# Patient Record
Sex: FEMALE | Employment: OTHER | URBAN - METROPOLITAN AREA
[De-identification: names, ages, dates, MRNs, and addresses within clinical notes are randomized per-mention and may not be internally consistent; named-entity substitution may affect disease eponyms.]

---

## 2018-02-27 ENCOUNTER — OFFICE VISIT (OUTPATIENT)
Dept: OPTOMETRY | Facility: CLINIC | Age: 83
End: 2018-02-27
Payer: COMMERCIAL

## 2018-02-27 DIAGNOSIS — H52.223 REGULAR ASTIGMATISM OF BOTH EYES: ICD-10-CM

## 2018-02-27 DIAGNOSIS — Z96.1 PSEUDOPHAKIA OF BOTH EYES: ICD-10-CM

## 2018-02-27 DIAGNOSIS — H52.4 PRESBYOPIA: Primary | ICD-10-CM

## 2018-02-27 DIAGNOSIS — H04.123 INSUFFICIENCY OF TEAR FILM OF BOTH EYES: ICD-10-CM

## 2018-02-27 PROCEDURE — 92015 DETERMINE REFRACTIVE STATE: CPT | Performed by: OPTOMETRIST

## 2018-02-27 PROCEDURE — 92014 COMPRE OPH EXAM EST PT 1/>: CPT | Performed by: OPTOMETRIST

## 2018-02-27 ASSESSMENT — VISUAL ACUITY
OS_CC: 20/30-2
OD_CC: 20/40
OS_SC: 20/50
OD_SC+: -2
CORRECTION_TYPE: GLASSES
OD_SC: 20/50
METHOD: SNELLEN - LINEAR
OS_CC: 20/40
OD_CC: 20/80-1

## 2018-02-27 ASSESSMENT — REFRACTION_MANIFEST
OS_ADD: +2.50
OD_SPHERE: -1.00
OS_AXIS: 005
OD_CYLINDER: +1.00
OS_SPHERE: -0.75
OD_AXIS: 008
OD_ADD: +2.50
OS_CYLINDER: +1.50

## 2018-02-27 ASSESSMENT — CONF VISUAL FIELD
OD_NORMAL: 1
OS_NORMAL: 1

## 2018-02-27 ASSESSMENT — TONOMETRY
OD_IOP_MMHG: 18
IOP_METHOD: TONOPEN
OS_IOP_MMHG: 15

## 2018-02-27 ASSESSMENT — SLIT LAMP EXAM - LIDS
COMMENTS: MEIBOMIAN GLAND DYSFUNCTION
COMMENTS: MEIBOMIAN GLAND DYSFUNCTION

## 2018-02-27 ASSESSMENT — EXTERNAL EXAM - RIGHT EYE: OD_EXAM: ROSACEA

## 2018-02-27 ASSESSMENT — REFRACTION_WEARINGRX
OS_AXIS: 013
OD_AXIS: 008
OD_ADD: +2.75
SPECS_TYPE: BIFOCAL
OD_CYLINDER: +0.75
OS_SPHERE: -0.50
OS_CYLINDER: +1.00
OD_SPHERE: -0.50
OS_ADD: +2.75

## 2018-02-27 ASSESSMENT — EXTERNAL EXAM - LEFT EYE: OS_EXAM: ROSACEA

## 2018-02-27 ASSESSMENT — CUP TO DISC RATIO
OD_RATIO: 0.4
OS_RATIO: 0.4

## 2018-02-27 NOTE — PATIENT INSTRUCTIONS
Eyeglass prescription given.    Systane Balance- 1 drop both eyes 4 x day.    Return in 1 year for a complete eye exam or sooner if needed.    Sarabjit Acevedo, TEMITOPE    The affects of the dilating drops last for 4- 6 hours.  You will be more sensitive to light and vision will be blurry up close.  Mydriatic sunglasses were given if needed.      Optometry Providers       Clinic Locations                                 Telephone Number   Dr. Elana Ross [facility] and [place]   [place] [phone]     [place] [facility] Hours:                [place] [facility] Hours:       [place] [facility] Hours:   [address]   [address]     [address] [facility] [place] [address]   [place] [address]    [place] [address]  Phone: [phone]                    Phone: [phone]     Phone: [phone]                      Monday 8:00-7:00                          Monday 8:00-7:00                          Monday 8:00-7:00              Tuesday 8:00-6:00                          Tuesday 8:00-7:00                          Tuesday 8:00-7:00              Wednesday 8:00-6:00                  Wednesday 8:00-7:00                   Wednesday 8:00-7:00      Thursday 8:00-6:00                        Thursday 8:00-7:00                         Thursday 8:00-7:00            Friday 8:00-5:00                              Friday 8:00-5:00                              Friday 8:00-5:00    [facility] Hours:   [address] [place] [address] [place] [address]  [phone]    Monday 8:00-7:00  Tuesday 8:00-7:00  Wednesday 8:00-7:00  Thursday 8:00-7:00  Friday 8:00-5:00  Please log on to [email or web address] to order your contact lenses.  The link is found on the [facility] Care [facility] Vision Services page.  As always, Thank you for trusting us with your health care needs!

## 2018-02-27 NOTE — LETTER
2/27/2018         RE: Jim Webster  [address]  [facility] [address]        Dear Colleague,    Thank you for referring your patient, Jim Webster, to the [facility]. Please see a copy of my visit note below.    Chief Complaint   Patient presents with     COMPREHENSIVE EYE EXAM         Last Eye Exam: 5-  Dilated Previously: Yes    What are you currently using to see?  glasses       Distance Vision Acuity: Satisfied with vision    Near Vision Acuity: Satisfied with vision while reading  with glasses    Eye Comfort: dry  Do you use eye drops? : Yes: systane and alway  Occupation or Hobbies: retired    Rula Cuellar Optometric Assistant, A.B.O.C.          Medical, surgical and family histories reviewed and updated 2/27/2018.       OBJECTIVE: See Ophthalmology exam    ASSESSMENT:    ICD-10-CM    1. Presbyopia H52.4 REFRACTION   2. Regular astigmatism of both eyes H52.223 REFRACTION   3. Pseudophakia of both eyes Z96.1 EYE EXAM (SIMPLE-NONBILLABLE)   4. 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    Systane Balance- 1 drop both eyes 4 x day.    Return in 1 year for a complete eye exam or sooner if needed.    Sarabjit Acevedo, TEMITOPE           Again, thank you for allowing me to participate in the care of your patient.        Sincerely,        Sarabjit Acevedo, OD

## 2018-02-27 NOTE — MR AVS SNAPSHOT
After Visit Summary   2/27/2018    Jim Webster    MRN: [id number]           Patient Information     Date Of Birth          1935        Visit Information        Provider Department      2/27/2018 10:00 AM Sarabjit Acevedo OD [facility]        Today's Diagnoses     Presbyopia    -  1    Regular astigmatism of both eyes        Pseudophakia of both eyes        Insufficiency of tear film of both eyes          Care Instructions    Eyeglass prescription given.    Systane Balance- 1 drop both eyes 4 x day.    Return in 1 year for a complete eye exam or sooner if needed.    Sarabjit Acevedo, TEMITOPE    The affects of the dilating drops last for 4- 6 hours.  You will be more sensitive to light and vision will be blurry up close.  Mydriatic sunglasses were given if needed.      Optometry Providers       Clinic Locations                                 Telephone Number   Dr. Elana Ross [facility] [place]   [place] [phone]     [place] [facility] Hours:                [facility] Hours:       [facility] Hours:   [address]   [address] [facility]     [address] [place] [address]   [place] [address]    [place] [address]  Phone: [phone]                    Phone: [phone]     Phone: [phone]                      Monday 8:00-7:00                          Monday 8:00-7:00                          Monday 8:00-7:00              Tuesday 8:00-6:00                          Tuesday 8:00-7:00                          Tuesday 8:00-7:00              Wednesday 8:00-6:00                  Wednesday 8:00-7:00                   Wednesday 8:00-7:00      Thursday 8:00-6:00                        Thursday 8:00-7:00                         Thursday 8:00-7:00            Friday 8:00-5:00                              Friday 8:00-5:00                       "        Friday 8:00-5:00    [facility] Hours:   [address] [place] [address]  [phone]    Monday 8:00-7:00  Tuesday 8:00-7:00  Wednesday 8:00-7:00  Thursday 8:00-7:00  Friday 8:00-5:00  Please log on to [email or web address] to order your contact lenses.  The link is found on the Eye Care and Vision Services page.  As always, Thank you for trusting us with your health care needs!              Follow-ups after your visit        Follow-up notes from your care team     Return in about 1 year (around 2019) for Annual Visit.      Who to contact     If you have questions or need follow up information about today's clinic visit or your schedule please contact [facility] directly at [phone].  Normal or non-critical lab and imaging results will be communicated to you by MyChart, letter or phone within 4 business days after the clinic has received the results. If you do not hear from us within 7 days, please contact the clinic through [employer] or phone. If you have a critical or abnormal lab result, we will notify you by phone as soon as possible.  Submit refill requests through [employer] or call your pharmacy and they will forward the refill request to us. Please allow 3 business days for your refill to be completed.          Additional Information About Your Visit        [employer] Information     [employer] lets you send messages to your doctor, view your test results, renew your prescriptions, schedule appointments and more. To sign up, go to [email or web address] . Click on \"Log in\" on the left side of the screen, which will take you to the Welcome page. Then click on \"Sign up Now\" on the right side of the page.     You will be asked to enter the access code listed below, as well as some personal information. Please follow the directions to create your username and password.     Your access code is: [email or web address]  Expires: 2018 12:50 PM     Your access code will  in 90 " days. If you need help or a new code, please call your [facility] clinic or [phone].        Care EveryWhere ID     This is your Care EveryWhere ID. This could be used by other organizations to access your [facility] medical records  [id number]         Blood Pressure from Last 3 Encounters:   01/08/15 138/52   12/11/14 130/56    Weight from Last 3 Encounters:   No data found for Wt              We Performed the Following     EYE EXAM (SIMPLE-NONBILLABLE)     REFRACTION        Primary Care Provider Fax #    Physician No Ref-Primary [phone]       No address on file        Equal Access to Services     [facility]: Hadii aad ku hadasho Soomaali, waaxda luqadaha, qaybta kaalmada adeterrayadamon, archana urrutia . So [facility] [phone].    ATENCIÓN: Si habla español, tiene a buckner disposición servicios gratuitos de asistencia lingüística. Llame al [phone].    We comply with applicable federal civil rights laws and [place] laws. We do not discriminate on the basis of race, color, national origin, age, disability, sex, sexual orientation, or gender identity.            Thank you!     Thank you for choosing [facility]  for your care. Our goal is always to provide you with excellent care. Hearing back from our patients is one way we can continue to improve our services. Please take a few minutes to complete the written survey that you may receive in the mail after your visit with us. Thank you!             Your Updated Medication List - Protect others around you: Learn how to safely use, store and throw away your medicines at www.disposemymeds.org.          This list is accurate as of 2/27/18 12:50 PM.  Always use your most recent med list.                   Brand Name Dispense Instructions for use Diagnosis    ALLEGRA 180 MG tablet   Generic drug:  fexofenadine      Take 180 mg by mouth daily.        lovastatin 20 MG tablet    MEVACOR     Take 20 mg by mouth At Bedtime.         metoprolol succinate 50 MG 24 hr tablet    TOPROL-XL     Take 50 mg by mouth daily.

## 2018-02-27 NOTE — PROGRESS NOTES
Chief Complaint   Patient presents with     COMPREHENSIVE EYE EXAM         Last Eye Exam: 5-  Dilated Previously: Yes    What are you currently using to see?  glasses       Distance Vision Acuity: Satisfied with vision    Near Vision Acuity: Satisfied with vision while reading  with glasses    Eye Comfort: dry  Do you use eye drops? : Yes: systane and alway  Occupation or Hobbies: retired    Rula Cuellar Optometric Assistant, A.B.O.C.          Medical, surgical and family histories reviewed and updated 2/27/2018.       OBJECTIVE: See Ophthalmology exam    ASSESSMENT:    ICD-10-CM    1. Presbyopia H52.4 REFRACTION   2. Regular astigmatism of both eyes H52.223 REFRACTION   3. Pseudophakia of both eyes Z96.1 EYE EXAM (SIMPLE-NONBILLABLE)   4. 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    Systane Balance- 1 drop both eyes 4 x day.    Return in 1 year for a complete eye exam or sooner if needed.    Sarabjit Acevedo, OD

## 2019-08-27 ENCOUNTER — OFFICE VISIT (OUTPATIENT)
Dept: OPTOMETRY | Facility: CLINIC | Age: 84
End: 2019-08-27
Payer: COMMERCIAL

## 2019-08-27 DIAGNOSIS — H52.223 REGULAR ASTIGMATISM OF BOTH EYES: ICD-10-CM

## 2019-08-27 DIAGNOSIS — H04.123 INSUFFICIENCY OF TEAR FILM OF BOTH EYES: ICD-10-CM

## 2019-08-27 DIAGNOSIS — Z01.00 EXAMINATION OF EYES AND VISION: Primary | ICD-10-CM

## 2019-08-27 DIAGNOSIS — H52.4 PRESBYOPIA: ICD-10-CM

## 2019-08-27 DIAGNOSIS — Z96.1 PSEUDOPHAKIA OF BOTH EYES: ICD-10-CM

## 2019-08-27 DIAGNOSIS — H10.13 ALLERGIC CONJUNCTIVITIS OF BOTH EYES: ICD-10-CM

## 2019-08-27 PROCEDURE — 92015 DETERMINE REFRACTIVE STATE: CPT | Performed by: OPTOMETRIST

## 2019-08-27 PROCEDURE — 92014 COMPRE OPH EXAM EST PT 1/>: CPT | Performed by: OPTOMETRIST

## 2019-08-27 ASSESSMENT — REFRACTION_MANIFEST
OS_SPHERE: -1.00
OD_CYLINDER: +1.00
OD_SPHERE: -1.00
OD_AXIS: 008
OS_ADD: +2.50
OS_AXIS: 005
OD_ADD: +2.50
OS_CYLINDER: +1.50

## 2019-08-27 ASSESSMENT — VISUAL ACUITY
OS_CC: 20/25-3
OS_SC+: -3
OD_CC: 20/30
OD_CC+: -2
OS_SC: 20/25
OD_SC+: -2
METHOD: SNELLEN - LINEAR
CORRECTION_TYPE: GLASSES
OD_CC: 20/25
OS_CC+: -2
OD_SC: 20/40
OS_CC: 20/25

## 2019-08-27 ASSESSMENT — REFRACTION_WEARINGRX
OS_CYLINDER: +1.50
OD_AXIS: 008
OD_ADD: +2.50
OS_ADD: +2.50
OD_SPHERE: -1.00
OS_AXIS: 005
SPECS_TYPE: BIFOCAL
OD_CYLINDER: +1.00
OS_SPHERE: -0.75

## 2019-08-27 ASSESSMENT — CUP TO DISC RATIO
OS_RATIO: 0.4
OD_RATIO: 0.4

## 2019-08-27 ASSESSMENT — CONF VISUAL FIELD
OS_NORMAL: 1
METHOD: COUNTING FINGERS
OD_NORMAL: 1

## 2019-08-27 ASSESSMENT — EXTERNAL EXAM - LEFT EYE: OS_EXAM: ROSACEA

## 2019-08-27 ASSESSMENT — SLIT LAMP EXAM - LIDS
COMMENTS: MEIBOMIAN GLAND DYSFUNCTION
COMMENTS: MEIBOMIAN GLAND DYSFUNCTION

## 2019-08-27 ASSESSMENT — TONOMETRY
OD_IOP_MMHG: 17
OS_IOP_MMHG: 15
IOP_METHOD: TONOPEN

## 2019-08-27 ASSESSMENT — EXTERNAL EXAM - RIGHT EYE: OD_EXAM: ROSACEA

## 2019-08-27 NOTE — PATIENT INSTRUCTIONS
Eyeglass prescription given.  No change in eyeglass prescription.    Systane Balance- 1 drop both eyes 4 x day.    OTC Alaway- 1 drop both eyes 2 x day as needed for itchy, watery eyes.    Return in 1 year for a complete eye exam or sooner if needed.    Sarabjit Acevedo, OD    The affects of the dilating drops last for 4- 6 hours.  You will be more sensitive to light and vision will be blurry up close.  Mydriatic sunglasses were given if needed.      Optometry Providers       Clinic Locations                                 Telephone Number   [facility]   [place] [phone]     [facility] [facility] Hours:                [facility] Hours:       [facility] Hours:   [address]   [address] [facility]     [address] [facility] [facility]  [place] [address]   [place] [address]    [place] [address]  Phone: [phone]                    Phone: [phone]     Phone: [phone]                      Monday 8:00-7:00                          Monday 8:00-7:00                          Monday 8:00-7:00              Tuesday 8:00-6:00                          Tuesday 8:00-7:00                          Tuesday 8:00-7:00              Wednesday 8:00-6:00                  Wednesday 8:00-7:00                   Wednesday 8:00-7:00      Thursday 8:00-6:00                        Thursday 8:00-7:00                         Thursday 8:00-7:00            Friday 8:00-5:00                              Friday 8:00-5:00                              Friday 8:00-5:00    [facility] Hours:   [address] [facility] [place] [place] [id number] [phone]    Monday 8:00-7:00  Tuesday 8:00-7:00  Wednesday 8:00-7:00  Thursday 8:00-7:00  Friday 8:00-5:00  Please log on to [email or web address] to order your contact lenses.  The link is found on the Eye Care and Vision Services page.  As always, Thank you for trusting us with your health care  needs!

## 2019-08-27 NOTE — LETTER
8/27/2019         RE: Jim Webster  [address]  [facility] [address]        Dear Colleague,    Thank you for referring your patient, Jim Webster, to the [facility]. Please see a copy of my visit note below.    Chief Complaint   Patient presents with     Annual Eye Exam      Accompanied by  not in room.  Last Eye Exam: 2/2018  Dilated Previously: Yes    What are you currently using to see?  glasses       Distance Vision Acuity: Satisfied with vision    Near Vision Acuity: Satisfied with vision while reading  with glasses    Eye Comfort: dry, watery and itchy - especially during spring and fall  Do you use eye drops? : Yes: Pt uses Systane or alaway which seems to help comfort issues.  Pt uses as needed  Occupation or Hobbies: retired - Pt loves to read and garden    [employer]            Medical, surgical and family histories reviewed and updated 8/27/2019.       OBJECTIVE: See Ophthalmology exam    ASSESSMENT:    ICD-10-CM    1. Examination of eyes and vision Z01.00 EYE EXAM (SIMPLE-NONBILLABLE)   2. Presbyopia H52.4 REFRACTION   3. Regular astigmatism of both eyes H52.223 REFRACTION   4. Pseudophakia of both eyes Z96.1 EYE EXAM (SIMPLE-NONBILLABLE)   5. Insufficiency of tear film of both eyes H04.123 EYE EXAM (SIMPLE-NONBILLABLE)   6. Allergic conjunctivitis of both eyes H10.13 EYE EXAM (SIMPLE-NONBILLABLE)      PLAN:     Patient Instructions   Eyeglass prescription given.  No change in eyeglass prescription.    Systane Balance- 1 drop both eyes 4 x day.    OTC Alaway- 1 drop both eyes 2 x day as needed for itchy, watery eyes.    Return in 1 year for a complete eye exam or sooner if needed.    Sarabjit Acevedo, OD           Again, thank you for allowing me to participate in the care of your patient.        Sincerely,        Sarabjit Acevedo, OD

## 2019-08-27 NOTE — PROGRESS NOTES
Chief Complaint   Patient presents with     Annual Eye Exam      Accompanied by  not in room.  Last Eye Exam: 2/2018  Dilated Previously: Yes    What are you currently using to see?  glasses       Distance Vision Acuity: Satisfied with vision    Near Vision Acuity: Satisfied with vision while reading  with glasses    Eye Comfort: dry, watery and itchy - especially during spring and fall  Do you use eye drops? : Yes: Pt uses Systane or alaway which seems to help comfort issues.  Pt uses as needed  Occupation or Hobbies: retired - Pt loves to read and garden    [employer]            Medical, surgical and family histories reviewed and updated 8/27/2019.       OBJECTIVE: See Ophthalmology exam    ASSESSMENT:    ICD-10-CM    1. Examination of eyes and vision Z01.00 EYE EXAM (SIMPLE-NONBILLABLE)   2. Presbyopia H52.4 REFRACTION   3. Regular astigmatism of both eyes H52.223 REFRACTION   4. Pseudophakia of both eyes Z96.1 EYE EXAM (SIMPLE-NONBILLABLE)   5. Insufficiency of tear film of both eyes H04.123 EYE EXAM (SIMPLE-NONBILLABLE)   6. Allergic conjunctivitis of both eyes H10.13 EYE EXAM (SIMPLE-NONBILLABLE)      PLAN:     Patient Instructions   Eyeglass prescription given.  No change in eyeglass prescription.    Systane Balance- 1 drop both eyes 4 x day.    OTC Alaway- 1 drop both eyes 2 x day as needed for itchy, watery eyes.    Return in 1 year for a complete eye exam or sooner if needed.    Sarabjit Acevedo, OD

## 2020-06-11 ENCOUNTER — THERAPY VISIT (OUTPATIENT)
Dept: PHYSICAL THERAPY | Facility: CLINIC | Age: 85
End: 2020-06-11
Payer: COMMERCIAL

## 2020-06-11 DIAGNOSIS — M25.561 CHRONIC PAIN OF RIGHT KNEE: ICD-10-CM

## 2020-06-11 DIAGNOSIS — G89.29 CHRONIC PAIN OF RIGHT KNEE: ICD-10-CM

## 2020-06-11 PROCEDURE — 97140 MANUAL THERAPY 1/> REGIONS: CPT | Mod: GP | Performed by: PHYSICAL THERAPIST

## 2020-06-11 PROCEDURE — 97110 THERAPEUTIC EXERCISES: CPT | Mod: GP | Performed by: PHYSICAL THERAPIST

## 2020-06-11 PROCEDURE — 97161 PT EVAL LOW COMPLEX 20 MIN: CPT | Mod: GP | Performed by: PHYSICAL THERAPIST

## 2020-06-11 ASSESSMENT — ACTIVITIES OF DAILY LIVING (ADL)
KNEE_ACTIVITY_OF_DAILY_LIVING_SUM: 37
RAW_SCORE: 37
SIT WITH YOUR KNEE BENT: ACTIVITY IS FAIRLY DIFFICULT
HOW_WOULD_YOU_RATE_THE_CURRENT_FUNCTION_OF_YOUR_KNEE_DURING_YOUR_USUAL_DAILY_ACTIVITIES_ON_A_SCALE_FROM_0_TO_100_WITH_100_BEING_YOUR_LEVEL_OF_KNEE_FUNCTION_PRIOR_TO_YOUR_INJURY_AND_0_BEING_THE_INABILITY_TO_PERFORM_ANY_OF_YOUR_USUAL_DAILY_ACTIVITIES?: 60
STIFFNESS: THE SYMPTOM AFFECTS MY ACTIVITY SLIGHTLY
GIVING WAY, BUCKLING OR SHIFTING OF KNEE: I HAVE THE SYMPTOM BUT IT DOES NOT AFFECT MY ACTIVITY
HOW_WOULD_YOU_RATE_THE_OVERALL_FUNCTION_OF_YOUR_KNEE_DURING_YOUR_USUAL_DAILY_ACTIVITIES?: ABNORMAL
WALK: ACTIVITY IS SOMEWHAT DIFFICULT
LIMPING: THE SYMPTOM AFFECTS MY ACTIVITY MODERATELY
KNEEL ON THE FRONT OF YOUR KNEE: ACTIVITY IS FAIRLY DIFFICULT
KNEE_ACTIVITY_OF_DAILY_LIVING_SCORE: 52.86
AS_A_RESULT_OF_YOUR_KNEE_INJURY,_HOW_WOULD_YOU_RATE_YOUR_CURRENT_LEVEL_OF_DAILY_ACTIVITY?: ABNORMAL
SWELLING: THE SYMPTOM AFFECTS MY ACTIVITY SLIGHTLY
SQUAT: ACTIVITY IS FAIRLY DIFFICULT
RISE FROM A CHAIR: ACTIVITY IS MINIMALLY DIFFICULT
WEAKNESS: THE SYMPTOM AFFECTS MY ACTIVITY MODERATELY
STAND: ACTIVITY IS SOMEWHAT DIFFICULT
GO DOWN STAIRS: ACTIVITY IS SOMEWHAT DIFFICULT
PAIN: THE SYMPTOM AFFECTS MY ACTIVITY SEVERELY
GO UP STAIRS: ACTIVITY IS SOMEWHAT DIFFICULT

## 2020-06-11 NOTE — PROGRESS NOTES
[facility] Initial Evaluation  Subjective:  The history is provided by the patient. No  was used.   Therapist Generated HPI Evaluation  Problem details: I have had knee pain since October 2019, the month and 1/2 the knee pain has increased.  I am not sleeping.  .         Type of problem:  Right knee.    This is a chronic condition.  Condition occurred with:  Insidious onset.    Patient reports pain:  In the joint.    Pain radiates to:  Thigh, knee, lower leg and hip.     Associated with: feeeling of giving out  Symptoms are exacerbated by ascending stairs and descending stairs (heat and cold hurt the knee, tylenol, activity does not hurt but will be painful later )  Relieved by: heat in the hip and the pain decreased, bengay, patches.          Patient Health History  Jim Webster being seen for right knee pain .     Problem began: 5/29/2020 (MD appointment).   Problem occurred: unknown   Pain is reported as 3/10 (at night 8-9/10) on pain scale.  General health as reported by patient is good.  Pertinent medical history includes: cancer, diabetes, high blood pressure and osteoarthritis (breast cancer 2000).     Medical allergies: latex and adhesives. Other medical allergies details: penicilllin.   Surgeries include:  Cancer surgery. Other surgery history details: breast, hysterectomy.    Current medications:  High blood pressure medication and sleep medication. Other medications details: statin.          Other job/home tasks details: walking, house work, garden, reading.                                  Objective:  Standing Alignment:    Cervical/Thoracic:  Forward head and convex thoracic scoliosis R (fair sitting posture)  Shoulder/UE:  Rounded shoulders and elevated scapula R    Pelvic:  ASIS high R    Knee deviations alignment: both knees flexed right greater than left.      Gait:    Gait Type:  Antalgic     Deviations:  Hip:  Decr dynamic control R and Trendelenberg  RGeneral Deviations:  Stance time decr, stride length decr and asa decr    Flexibility/Screens:   Positive screens:  Hip and Knee    Lower Extremity:      Decreased right lower extremity flexibility:  Hip IR's; Hip ER's; Adductors; Hip Flexors; IT Band; Quadriceps and Hamstrings               Lumbar/SI Evaluation  ROM:    AROM Lumbar:   Flexion:        Toes no change  Ext:                    Moderate movement no change   Side Bend:        Left:     Right:   Rotation:           Left:     Right:   Side Glide:        Left:     Right:                                                               Hip Evaluation  Hip PROM:    Flexion: Left: WFL   Right: 95  Extension: Left:  Right: tightness  Abduction: Left: 15-20 degrees    Right: 10-15 with pain    Internal Rotation: Left: 10    Right: 0 and tightness  External Rotation: Left: 45    Right: 30 with pain in the knee              Hip Strength:    Flexion:   Left: 5/5   Pain:  Right: 5/5   Pain:                        Adduction:  Right: 2+/5   Pain:  Internal Rotation:  Left: 5/5    Pain:Right: 4+/5   ++  Pain:  External Rotation:  Left: 5/5   Pain:  Right: 4+/5   Pain:            Hip Special Testing:       Right hip positive for the following special tests:  Fadir/Labrum and Yfn (tightness)Right hip negative for the following special tests:  SLR (tightness)    Hip Palpation:      Right hip tenderness present at:  Greater Trachanter; Gluteus Medius and Bursa  Right hip tenderness not present at:  Ischial Tuberosity  Functional Testing:            Proprioception:    Stork balance test:   Left:    2  Right:  1  % of Uninvolved:          Knee Evaluation:  ROM:    AROM      Extension:  Left: 14    Right:  11  Flexion: Left: 118    Right: 122  PROM    Hyperextension: Left: 0    Right:  0  Extension: Left: 0    Right:  10  Flexion: Left: 132    Right:  130      Strength:     Extension:  Left: 5/5   Pain:      Right: 5/5   Pain:  Flexion:  Left: 5/5   Pain:      Right: 5/5    Pain:        Ligament Testing:    Varus 0:  Right:  Neg    Valgus 0:  Right:  Neg          Special Tests:     Right knee positive for the following tests:  Patellar Tracking-Abduction Lateral  Palpation:  Palpation of knee: right adductors.      Edema:  Edema of the knee: general knee edema.            General     ROS    Assessment/Plan:    Patient is a 85 year old female with right side knee complaints.    Patient has the following significant findings with corresponding treatment plan.                Diagnosis 1:  Right knee pain with involvement of right hip  Pain -  manual therapy, self management, education and home program  Decreased ROM/flexibility - manual therapy, therapeutic exercise, therapeutic activity and home program  Decreased joint mobility - manual therapy, therapeutic exercise, therapeutic activity and home program  Decreased strength - therapeutic exercise, therapeutic activities and home program  Impaired balance - neuro re-education, gait training, therapeutic activities and home program  Impaired gait - gait training, assistive devices and home program  Impaired muscle performance - neuro re-education and home program  Decreased function - therapeutic activities and home program  Impaired posture - neuro re-education, therapeutic activities and home program    Therapy Evaluation Codes:   Cumulative Therapy Evaluation is: Low complexity.    Previous and current functional limitations:  (See Goal Flow Sheet for this information)    Short term and Long term goals: (See Goal Flow Sheet for this information)     Communication ability:  Patient appears to be able to clearly communicate and understand verbal and written communication and follow directions correctly.  Treatment Explanation - The following has been discussed with the patient:   RX ordered/plan of care  This patient would benefit from PT intervention to resume normal activities.   Rehab potential is good.    Frequency:  2 X week, once  daily  Duration:  for 1 weeks tapering to 1 X a week over 4 weeks  Discharge Plan:  Achieve all LTG.  Independent in home treatment program.    Please refer to the daily flowsheet for treatment today, total treatment time and time spent performing 1:1 timed codes.

## 2020-06-11 NOTE — LETTER
[facility]  [address]  [facility] [address]  [phone]    [place] 15, 2020    Re: Jim Webster   :   1935  MRN:  [id number]   REFERRING PHYSICIAN:   Boaz Chavez    [facility] PHYSICAL [facility]    Date of Initial Evaluation:  2020  Visits:   1  Reason for Referral:  Chronic pain of right knee    EVALUATION SUMMARY    [facility] Initial Evaluation  Subjective:  The history is provided by the patient. No  was used.   Therapist Generated HPI Evaluation  Problem details: I have had knee pain since 2019, the month and 1/2 the knee pain has increased.  I am not sleeping.  .         Type of problem:  Right knee.  This is a chronic condition.  Condition occurred with:  Insidious onset.  Patient reports pain:  In the joint.  Pain radiates to:  Thigh, knee, lower leg and hip.   Associated with: feeeling of giving out  Symptoms are exacerbated by ascending stairs and descending stairs (heat and cold hurt the knee, tylenol, activity does not hurt but will be painful later )  Relieved by: heat in the hip and the pain decreased, bengay, patches.    Patient Health History  Jim Webster being seen for right knee pain .   Problem began: 2020 (MD appointment).   Problem occurred: unknown   Pain is reported as 3/10 (at night 8-9/10) on pain scale.  General health as reported by patient is good.  Pertinent medical history includes: cancer, diabetes, high blood pressure and osteoarthritis (breast cancer 2000).   Medical allergies: latex and adhesives. Other medical allergies details: penicilllin.   Surgeries include:  Cancer surgery. Other surgery history details: breast, hysterectomy.    Current medications:  High blood pressure medication and sleep medication. Other medications details: statin.  Other job/home tasks details:  walking, house work, garden, reading.                        Re: Jim Webster   :   1935    Objective:  Standing Alignment:    Cervical/Thoracic:  Forward head and convex thoracic scoliosis R (fair sitting posture)  Shoulder/UE:  Rounded shoulders and elevated scapula R  Pelvic:  ASIS high R  Knee deviations alignment: both knees flexed right greater than left.  Gait:    Gait Type:  Antalgic     Deviations:  Hip:  Decr dynamic control R and Trendelenberg RGeneral Deviations:  Stance time decr, stride length decr and asa decr  Flexibility/Screens:   Positive screens:  Hip and Knee  Lower Extremity:  Decreased right lower extremity flexibility:  Hip IR's; Hip ER's; Adductors; Hip Flexors; IT Band; Quadriceps and Hamstrings    Lumbar/SI Evaluation  ROM:    AROM Lumbar:   Flexion:        Toes no change  Ext:                    Moderate movement no change   Side Bend:        Left:     Right:   Rotation:           Left:     Right:   Side Glide:        Left:     Right:         Hip Evaluation  Hip PROM:    Flexion: Left: WFL   Right: 95  Extension: Left:  Right: tightness  Abduction: Left: 15-20 degrees    Right: 10-15 with pain  Internal Rotation: Left: 10    Right: 0 and tightness  External Rotation: Left: 45    Right: 30 with pain in the knee    Hip Strength:    Flexion:   Left: 5/5   Pain:  Right: 5/5   Pain:  Adduction:  Right: 2+/5   Pain:  Internal Rotation:  Left: 5/5    Pain:Right: 4+/5   ++  Pain:  External Rotation:  Left: 5/5   Pain:  Right: 4+/5   Pain:  Hip Special Testing:    Right hip positive for the following special tests:  Fadir/Labrum and Yfn (tightness)Right hip negative for the following special tests:  SLR (tightness)    Hip Palpation:    Right hip tenderness present at:  Greater Trachanter; Gluteus Medius and Bursa  Right hip tenderness not present at:  Ischial Tuberosity  Functional Testing:      Proprioception:  Stork balance test:   Left:    2  Right:  1  % of Uninvolved:      Re: Jim Webster   :   1935    Knee Evaluation:  ROM:    AROM  Extension:  Left: 14    Right:  11  Flexion: Left: 118    Right: 122  PROM  Hyperextension: Left: 0    Right:  0  Extension: Left: 0    Right:  10  Flexion: Left: 132    Right:  130  Strength:   Extension:  Left: 5/5   Pain:      Right: 5/5   Pain:  Flexion:  Left: 5/5   Pain:      Right: 5/5   Pain:    Ligament Testing:    Varus 0:  Right:  Neg  Valgus 0:  Right:  Neg  Special Tests:   Right knee positive for the following tests:  Patellar Tracking-Abduction Lateral  Palpation:  Palpation of knee: right adductors.  Edema:  Edema of the knee: general knee edema.    Assessment/Plan:    Patient is a 85 year old female with right side knee complaints.    Patient has the following significant findings with corresponding treatment plan.                Diagnosis 1:  Right knee pain with involvement of right hip  Pain -  manual therapy, self management, education and home program  Decreased ROM/flexibility - manual therapy, therapeutic exercise, therapeutic activity and home program  Decreased joint mobility - manual therapy, therapeutic exercise, therapeutic activity and home program  Decreased strength - therapeutic exercise, therapeutic activities and home program  Impaired balance - neuro re-education, gait training, therapeutic activities and home program  Impaired gait - gait training, assistive devices and home program  Impaired muscle performance - neuro re-education and home program  Decreased function - therapeutic activities and home program  Impaired posture - neuro re-education, therapeutic activities and home program    Therapy Evaluation Codes:   Cumulative Therapy Evaluation is: Low complexity.  Previous and current functional limitations:  (See Goal Flow Sheet for this information)    Short term and Long term goals: (See Goal Flow Sheet for this information)   Communication ability:  Patient appears to be able to clearly  communicate and understand verbal and written communication and follow directions correctly.  Treatment Explanation - The following has been discussed with the patient:   RX ordered/plan of care.              Re: Jim Webster   :   1935    This patient would benefit from PT intervention to resume normal activities.   Rehab potential is good.  Frequency:  2 X week, once daily  Duration:  for 1 weeks tapering to 1 X a week over 4 weeks  Discharge Plan:  Achieve all LTG.  Independent in home treatment program.    Thank you for your referral.    INQUIRIES        Therapist: Lisa Franco   [facility] PH[facility]ICAL THERAP[facility]  [address] [address]  Phone: [phone]  Fax: [phone]

## 2020-06-15 ENCOUNTER — THERAPY VISIT (OUTPATIENT)
Dept: PHYSICAL THERAPY | Facility: CLINIC | Age: 85
End: 2020-06-15
Payer: COMMERCIAL

## 2020-06-15 DIAGNOSIS — M25.561 CHRONIC PAIN OF RIGHT KNEE: ICD-10-CM

## 2020-06-15 DIAGNOSIS — G89.29 CHRONIC PAIN OF RIGHT KNEE: ICD-10-CM

## 2020-06-15 PROCEDURE — 97110 THERAPEUTIC EXERCISES: CPT | Mod: GP | Performed by: PHYSICAL THERAPIST

## 2020-06-15 PROCEDURE — 97140 MANUAL THERAPY 1/> REGIONS: CPT | Mod: GP | Performed by: PHYSICAL THERAPIST

## 2020-06-15 PROCEDURE — 97112 NEUROMUSCULAR REEDUCATION: CPT | Mod: GP | Performed by: PHYSICAL THERAPIST

## 2020-06-18 ENCOUNTER — THERAPY VISIT (OUTPATIENT)
Dept: PHYSICAL THERAPY | Facility: CLINIC | Age: 85
End: 2020-06-18
Payer: COMMERCIAL

## 2020-06-18 DIAGNOSIS — G89.29 CHRONIC PAIN OF RIGHT KNEE: ICD-10-CM

## 2020-06-18 DIAGNOSIS — M25.561 CHRONIC PAIN OF RIGHT KNEE: ICD-10-CM

## 2020-06-18 PROCEDURE — 97112 NEUROMUSCULAR REEDUCATION: CPT | Mod: GP | Performed by: PHYSICAL THERAPIST

## 2020-06-18 PROCEDURE — 97140 MANUAL THERAPY 1/> REGIONS: CPT | Mod: GP | Performed by: PHYSICAL THERAPIST

## 2020-06-18 PROCEDURE — 97110 THERAPEUTIC EXERCISES: CPT | Mod: GP | Performed by: PHYSICAL THERAPIST

## 2020-07-02 ENCOUNTER — THERAPY VISIT (OUTPATIENT)
Dept: PHYSICAL THERAPY | Facility: CLINIC | Age: 85
End: 2020-07-02
Payer: COMMERCIAL

## 2020-07-02 DIAGNOSIS — G89.29 CHRONIC PAIN OF RIGHT KNEE: ICD-10-CM

## 2020-07-02 DIAGNOSIS — M25.561 CHRONIC PAIN OF RIGHT KNEE: ICD-10-CM

## 2020-07-02 PROCEDURE — 97110 THERAPEUTIC EXERCISES: CPT | Mod: GP | Performed by: PHYSICAL THERAPIST

## 2020-07-02 PROCEDURE — 97140 MANUAL THERAPY 1/> REGIONS: CPT | Mod: GP | Performed by: PHYSICAL THERAPIST

## 2020-07-02 PROCEDURE — 97112 NEUROMUSCULAR REEDUCATION: CPT | Mod: GP | Performed by: PHYSICAL THERAPIST

## 2020-07-02 ASSESSMENT — ACTIVITIES OF DAILY LIVING (ADL)
KNEEL ON THE FRONT OF YOUR KNEE: ACTIVITY IS SOMEWHAT DIFFICULT
PAIN: I HAVE THE SYMPTOM BUT IT DOES NOT AFFECT MY ACTIVITY
LIMPING: I DO NOT HAVE THE SYMPTOM
WEAKNESS: I DO NOT HAVE THE SYMPTOM
GO UP STAIRS: ACTIVITY IS SOMEWHAT DIFFICULT
HOW_WOULD_YOU_RATE_THE_CURRENT_FUNCTION_OF_YOUR_KNEE_DURING_YOUR_USUAL_DAILY_ACTIVITIES_ON_A_SCALE_FROM_0_TO_100_WITH_100_BEING_YOUR_LEVEL_OF_KNEE_FUNCTION_PRIOR_TO_YOUR_INJURY_AND_0_BEING_THE_INABILITY_TO_PERFORM_ANY_OF_YOUR_USUAL_DAILY_ACTIVITIES?: 90
KNEE_ACTIVITY_OF_DAILY_LIVING_SUM: 57
SWELLING: I HAVE THE SYMPTOM BUT IT DOES NOT AFFECT MY ACTIVITY
HOW_WOULD_YOU_RATE_THE_OVERALL_FUNCTION_OF_YOUR_KNEE_DURING_YOUR_USUAL_DAILY_ACTIVITIES?: SEVERELY ABNORMAL
RISE FROM A CHAIR: ACTIVITY IS MINIMALLY DIFFICULT
STIFFNESS: I DO NOT HAVE THE SYMPTOM
GO DOWN STAIRS: ACTIVITY IS MINIMALLY DIFFICULT
STAND: ACTIVITY IS SOMEWHAT DIFFICULT
SIT WITH YOUR KNEE BENT: ACTIVITY IS MINIMALLY DIFFICULT
KNEE_ACTIVITY_OF_DAILY_LIVING_SCORE: 81.43
RAW_SCORE: 57
GIVING WAY, BUCKLING OR SHIFTING OF KNEE: I DO NOT HAVE THE SYMPTOM
AS_A_RESULT_OF_YOUR_KNEE_INJURY,_HOW_WOULD_YOU_RATE_YOUR_CURRENT_LEVEL_OF_DAILY_ACTIVITY?: SEVERELY ABNORMAL
WALK: ACTIVITY IS MINIMALLY DIFFICULT
SQUAT: ACTIVITY IS MINIMALLY DIFFICULT

## 2020-10-23 PROBLEM — G89.29 CHRONIC PAIN OF RIGHT KNEE: Status: RESOLVED | Noted: 2020-06-11 | Resolved: 2020-10-23

## 2020-10-23 PROBLEM — M25.561 CHRONIC PAIN OF RIGHT KNEE: Status: RESOLVED | Noted: 2020-06-11 | Resolved: 2020-10-23

## 2020-10-23 NOTE — PROGRESS NOTES
Discharge Note    Progress reporting period is from initial evaluation date (please see noted date below) to Jul 2, 2020.  Linked Episodes   Type: Episode: Status: Noted: Resolved: Last update: Updated by:   PHYSICAL THERAPY right knee pain 6/11/2020 Active 6/11/2020 7/2/2020 10:52 AM Lisa Franco, DEEJAY      Comments:       Jim failed to follow up and current status is unknown.  Please see information below for last relevant information on current status.  Patient seen for 4 visits.    SUBJECTIVE  Subjective changes noted by patient:  I went camping and had no complaints.  I limited my activity.    .  Current pain level is 0/10.     Previous pain level was  5/10.   Changes in function:  Yes (See Goal flowsheet attached for changes in current functional level)  Adverse reaction to treatment or activity: None    OBJECTIVE  Changes noted in objective findings: strength hip flexor 5/5, quad 5-/5, ham 5/5, Hip IR 5/5, ER 4+/5, PROM 5 extension.  Balance 1-2 sec      ASSESSMENT/PLAN  Diagnosis: right knee pain with right hip involvement   Updated problem list and treatment plan:   Decreased ROM/flexibility - HEP  Decreased strength - HEP  Impaired muscle performance - HEP  Impaired balance - HEP  STG/LTGs have been met or progress has been made towards goals:  Yes, please see goal flowsheet for most current information  Assessment of Progress: current status is unknown.    Last current status: Pt is progressing as expected   Self Management Plans:  HEP  I have re-evaluated this patient and find that the nature, scope, duration and intensity of the therapy is appropriate for the medical condition of the patient.  Jim continues to require the following intervention to meet STG and LTG's:  HEP.    Recommendations:  Discharge with current home program.  Patient to follow up with MD as needed.    Please refer to the daily flowsheet for treatment today, total treatment time and time spent performing 1:1 timed  codes.

## 2021-03-09 ENCOUNTER — OFFICE VISIT (OUTPATIENT)
Dept: OPTOMETRY | Facility: CLINIC | Age: 86
End: 2021-03-09
Payer: COMMERCIAL

## 2021-03-09 DIAGNOSIS — H04.123 DRY EYE SYNDROME OF BOTH EYES: ICD-10-CM

## 2021-03-09 DIAGNOSIS — H52.4 PRESBYOPIA: ICD-10-CM

## 2021-03-09 DIAGNOSIS — H52.223 REGULAR ASTIGMATISM OF BOTH EYES: ICD-10-CM

## 2021-03-09 DIAGNOSIS — Z96.1 PSEUDOPHAKIA OF BOTH EYES: ICD-10-CM

## 2021-03-09 DIAGNOSIS — H10.13 ALLERGIC CONJUNCTIVITIS OF BOTH EYES: ICD-10-CM

## 2021-03-09 DIAGNOSIS — Z01.00 EXAMINATION OF EYES AND VISION: Primary | ICD-10-CM

## 2021-03-09 PROCEDURE — 92014 COMPRE OPH EXAM EST PT 1/>: CPT | Performed by: OPTOMETRIST

## 2021-03-09 PROCEDURE — 92015 DETERMINE REFRACTIVE STATE: CPT | Performed by: OPTOMETRIST

## 2021-03-09 ASSESSMENT — VISUAL ACUITY
OS_CC: 20/30
OD_SC+: -1
OD_CC: 20/30
OS_CC: 20/30
METHOD: SNELLEN - LINEAR
OS_SC: 20/60
OD_CC+: -1
OD_SC: 20/70
OD_SC: 20/60
OS_SC+: -1
CORRECTION_TYPE: GLASSES
OD_CC: 20/25
OS_SC: 20/30

## 2021-03-09 ASSESSMENT — CONF VISUAL FIELD
OS_NORMAL: 1
OD_NORMAL: 1

## 2021-03-09 ASSESSMENT — EXTERNAL EXAM - LEFT EYE: OS_EXAM: ROSACEA

## 2021-03-09 ASSESSMENT — EXTERNAL EXAM - RIGHT EYE: OD_EXAM: ROSACEA

## 2021-03-09 ASSESSMENT — SLIT LAMP EXAM - LIDS
COMMENTS: MEIBOMIAN GLAND DYSFUNCTION
COMMENTS: MEIBOMIAN GLAND DYSFUNCTION

## 2021-03-09 ASSESSMENT — REFRACTION_WEARINGRX
OD_CYLINDER: +1.00
OS_CYLINDER: +1.50
OS_ADD: +2.50
OS_AXIS: 005
OD_AXIS: 008
SPECS_TYPE: BIFOCAL
OD_ADD: +2.50
OD_SPHERE: -1.00
OS_SPHERE: -0.75

## 2021-03-09 ASSESSMENT — CUP TO DISC RATIO
OS_RATIO: 0.4
OD_RATIO: 0.4

## 2021-03-09 ASSESSMENT — REFRACTION_MANIFEST
OD_ADD: +2.50
OS_AXIS: 005
OD_AXIS: 008
OS_ADD: +2.50
OS_SPHERE: -0.75
OS_CYLINDER: +1.75
OD_SPHERE: -0.75
OD_CYLINDER: +1.00

## 2021-03-09 ASSESSMENT — TONOMETRY
OD_IOP_MMHG: 18
OS_IOP_MMHG: 19
IOP_METHOD: TONOPEN

## 2021-03-09 NOTE — Clinical Note
3/9/2021         RE: Jim Webster  [address]  [facility] [address]        Dear Colleague,    Thank you for referring your patient, Jim Webster, to the [facility]. Please see a copy of my visit note below.    No notes on file    Again, thank you for allowing me to participate in the care of your patient.        Sincerely,        Sarabjit Acevedo OD

## 2021-03-09 NOTE — LETTER
3/9/2021         RE: Jim Webster  [address]  [facility] [address]        Dear Colleague,    Thank you for referring your patient, Jim Webster, to the [facility]. Please see a copy of my visit note below.    Chief Complaint   Patient presents with     Annual Eye Exam         Last Eye Exam: 2019  Dilated Previously: Yes, OK to dilate    What are you currently using to see?  glasses       Distance Vision Acuity: Satisfied with vision    Near Vision Acuity: Satisfied with vision while reading  with glasses    Eye Comfort: Dry  Do you use eye drops? : Systane  Occupation or Hobbies: Reading    [place]      Medical, surgical and family histories reviewed and updated 3/9/2021.       OBJECTIVE: See Ophthalmology exam    ASSESSMENT:    ICD-10-CM    1. Examination of eyes and vision  Z01.00 EYE EXAM (SIMPLE-NONBILLABLE)   2. Presbyopia  H52.4 REFRACTION   3. Regular astigmatism of both eyes  H52.223 REFRACTION   4. Pseudophakia of both eyes  Z96.1 EYE EXAM (SIMPLE-NONBILLABLE)   5. Allergic conjunctivitis of both eyes  H10.13 EYE EXAM (SIMPLE-NONBILLABLE)   6. Dry eye syndrome of both eyes  H04.123 EYE EXAM (SIMPLE-NONBILLABLE)      PLAN:     Patient Instructions   Eyeglass prescription given.  Optional change in eyeglass prescription.    OTC Pataday to be used once or twice daily for itchy eyes.  Use as needed.    Systane Ultra 1 drop both eyes once in am and once in pm and as needed during the day.    Return in 1 year for a complete eye exam or sooner if needed.    Sarabjit Acevedo, TEMITOPE               Again, thank you for allowing me to participate in the care of your patient.        Sincerely,        Sarabjit Acevedo, OD

## 2021-03-09 NOTE — PATIENT INSTRUCTIONS
Eyeglass prescription given.  Optional change in eyeglass prescription.    OTC Pataday to be used once or twice daily for itchy eyes.  Use as needed.    Systane Ultra 1 drop both eyes once in am and once in pm and as needed during the day.    Return in 1 year for a complete eye exam or sooner if needed.    Sarabjit Acevedo, TEMITOPE    The affects of the dilating drops last for 4- 6 hours.  You will be more sensitive to light and vision will be blurry up close.  Do not drive if you do not feel comfortable.  Mydriatic sunglasses were given if needed.      Optometry Providers       Clinic Locations                                 Telephone Number   Dr. Beena Fink [phone]     [facility] Hours:                [facility] Hours:       [facility] Hours:   [address]   [address]     [address]  [place] [address]   [place] [address]    [place] [address]  Phone: [phone]                    Phone: [phone]     Phone: [phone]                      Monday 8:00-7:00                          Monday 8:00-7:00                          Monday 8:00-7:00              Tuesday 8:00-6:00                          Tuesday 8:00-7:00                          Tuesday 8:00-7:00              Wednesday 8:00-6:00                  Wednesday 8:00-7:00                   Wednesday 8:00-7:00      Thursday 8:00-6:00                        Thursday 8:00-7:00                         Thursday 8:00-7:00            Friday 8:00-5:00                              Friday 8:00-5:00                              Friday 8:00-5:00    [facility] Hours:   [address] [place] [id number] [phone]    Monday 8:00-7:00  Tuesday 8:00-7:00  Wednesday 8:00-7:00  Thursday 8:00-7:00  Friday 8:00-5:00  Please log on to [email or web address] to order your contact lenses.  The link is found on the Eye Care and  Vision Services page.  As always, Thank you for trusting us with your health care needs!

## 2024-09-16 ENCOUNTER — LAB REQUISITION (OUTPATIENT)
Dept: LAB | Facility: CLINIC | Age: 89
End: 2024-09-16
Payer: COMMERCIAL

## 2024-09-16 DIAGNOSIS — K75.0 ABSCESS OF LIVER: ICD-10-CM

## 2024-09-17 LAB
AST SERPL W P-5'-P-CCNC: 30 U/L (ref 0–45)
BASOPHILS # BLD AUTO: 0 10E3/UL (ref 0–0.2)
BASOPHILS NFR BLD AUTO: 0 %
BUN SERPL-MCNC: 27.1 MG/DL (ref 8–23)
CREAT SERPL-MCNC: 0.76 MG/DL (ref 0.51–0.95)
CRP SERPL-MCNC: 96.5 MG/L
EGFRCR SERPLBLD CKD-EPI 2021: 75 ML/MIN/1.73M2
EOSINOPHIL # BLD AUTO: 0 10E3/UL (ref 0–0.7)
EOSINOPHIL NFR BLD AUTO: 0 %
ERYTHROCYTE [DISTWIDTH] IN BLOOD BY AUTOMATED COUNT: 14.9 % (ref 10–15)
HCT VFR BLD AUTO: 24.8 % (ref 35–47)
HGB BLD-MCNC: 7.7 G/DL (ref 11.7–15.7)
IMM GRANULOCYTES # BLD: 0.1 10E3/UL
IMM GRANULOCYTES NFR BLD: 1 %
LYMPHOCYTES # BLD AUTO: 1.3 10E3/UL (ref 0.8–5.3)
LYMPHOCYTES NFR BLD AUTO: 9 %
MCH RBC QN AUTO: 29.4 PG (ref 26.5–33)
MCHC RBC AUTO-ENTMCNC: 31 G/DL (ref 31.5–36.5)
MCV RBC AUTO: 95 FL (ref 78–100)
MONOCYTES # BLD AUTO: 1.2 10E3/UL (ref 0–1.3)
MONOCYTES NFR BLD AUTO: 8 %
NEUTROPHILS # BLD AUTO: 12.4 10E3/UL (ref 1.6–8.3)
NEUTROPHILS NFR BLD AUTO: 82 %
NRBC # BLD AUTO: 0 10E3/UL
NRBC BLD AUTO-RTO: 0 /100
PLATELET # BLD AUTO: 270 10E3/UL (ref 150–450)
RBC # BLD AUTO: 2.62 10E6/UL (ref 3.8–5.2)
WBC # BLD AUTO: 15.1 10E3/UL (ref 4–11)

## 2024-09-17 PROCEDURE — 86140 C-REACTIVE PROTEIN: CPT | Performed by: NURSE PRACTITIONER

## 2024-09-17 PROCEDURE — P9604 ONE-WAY ALLOW PRORATED TRIP: HCPCS | Performed by: NURSE PRACTITIONER

## 2024-09-17 PROCEDURE — 85025 COMPLETE CBC W/AUTO DIFF WBC: CPT | Performed by: NURSE PRACTITIONER

## 2024-09-17 PROCEDURE — 82565 ASSAY OF CREATININE: CPT | Performed by: NURSE PRACTITIONER

## 2024-09-17 PROCEDURE — 84450 TRANSFERASE (AST) (SGOT): CPT | Performed by: NURSE PRACTITIONER

## 2024-09-17 PROCEDURE — 84520 ASSAY OF UREA NITROGEN: CPT | Performed by: NURSE PRACTITIONER

## 2024-09-17 PROCEDURE — 36415 COLL VENOUS BLD VENIPUNCTURE: CPT | Performed by: NURSE PRACTITIONER

## 2024-09-20 ENCOUNTER — LAB REQUISITION (OUTPATIENT)
Dept: LAB | Facility: CLINIC | Age: 89
End: 2024-09-20
Payer: COMMERCIAL

## 2024-09-20 DIAGNOSIS — K75.0 ABSCESS OF LIVER: ICD-10-CM

## 2024-09-23 LAB
AST SERPL W P-5'-P-CCNC: 30 U/L (ref 0–45)
BASOPHILS # BLD AUTO: 0 10E3/UL (ref 0–0.2)
BASOPHILS NFR BLD AUTO: 0 %
BUN SERPL-MCNC: 25.7 MG/DL (ref 8–23)
CREAT SERPL-MCNC: 0.77 MG/DL (ref 0.51–0.95)
CRP SERPL-MCNC: 78 MG/L
EGFRCR SERPLBLD CKD-EPI 2021: 74 ML/MIN/1.73M2
EOSINOPHIL # BLD AUTO: 0.1 10E3/UL (ref 0–0.7)
EOSINOPHIL NFR BLD AUTO: 1 %
ERYTHROCYTE [DISTWIDTH] IN BLOOD BY AUTOMATED COUNT: 14.6 % (ref 10–15)
GLUCOSE SERPL-MCNC: 102 MG/DL (ref 70–99)
HCT VFR BLD AUTO: 29.5 % (ref 35–47)
HGB BLD-MCNC: 9 G/DL (ref 11.7–15.7)
IMM GRANULOCYTES # BLD: 0.1 10E3/UL
IMM GRANULOCYTES NFR BLD: 1 %
LYMPHOCYTES # BLD AUTO: 1.2 10E3/UL (ref 0.8–5.3)
LYMPHOCYTES NFR BLD AUTO: 12 %
MCH RBC QN AUTO: 28.6 PG (ref 26.5–33)
MCHC RBC AUTO-ENTMCNC: 30.5 G/DL (ref 31.5–36.5)
MCV RBC AUTO: 94 FL (ref 78–100)
MONOCYTES # BLD AUTO: 1.3 10E3/UL (ref 0–1.3)
MONOCYTES NFR BLD AUTO: 12 %
NEUTROPHILS # BLD AUTO: 7.8 10E3/UL (ref 1.6–8.3)
NEUTROPHILS NFR BLD AUTO: 74 %
NRBC # BLD AUTO: 0 10E3/UL
NRBC BLD AUTO-RTO: 0 /100
PLATELET # BLD AUTO: 316 10E3/UL (ref 150–450)
RBC # BLD AUTO: 3.15 10E6/UL (ref 3.8–5.2)
WBC # BLD AUTO: 10.5 10E3/UL (ref 4–11)

## 2024-09-23 PROCEDURE — 82947 ASSAY GLUCOSE BLOOD QUANT: CPT | Performed by: NURSE PRACTITIONER

## 2024-09-23 PROCEDURE — P9604 ONE-WAY ALLOW PRORATED TRIP: HCPCS | Performed by: NURSE PRACTITIONER

## 2024-09-23 PROCEDURE — 86140 C-REACTIVE PROTEIN: CPT | Performed by: NURSE PRACTITIONER

## 2024-09-23 PROCEDURE — 84450 TRANSFERASE (AST) (SGOT): CPT | Performed by: NURSE PRACTITIONER

## 2024-09-23 PROCEDURE — 85025 COMPLETE CBC W/AUTO DIFF WBC: CPT | Performed by: NURSE PRACTITIONER

## 2024-09-23 PROCEDURE — 82565 ASSAY OF CREATININE: CPT | Performed by: NURSE PRACTITIONER

## 2024-09-23 PROCEDURE — 84520 ASSAY OF UREA NITROGEN: CPT | Performed by: NURSE PRACTITIONER

## 2024-09-23 PROCEDURE — 36415 COLL VENOUS BLD VENIPUNCTURE: CPT | Performed by: NURSE PRACTITIONER

## 2024-09-27 ENCOUNTER — LAB REQUISITION (OUTPATIENT)
Dept: LAB | Facility: CLINIC | Age: 89
End: 2024-09-27
Payer: COMMERCIAL

## 2024-09-27 DIAGNOSIS — K75.0 ABSCESS OF LIVER: ICD-10-CM

## 2024-09-30 LAB
AST SERPL W P-5'-P-CCNC: 29 U/L (ref 0–45)
BASOPHILS # BLD AUTO: 0.1 10E3/UL (ref 0–0.2)
BASOPHILS NFR BLD AUTO: 0 %
BUN SERPL-MCNC: 28.9 MG/DL (ref 8–23)
CREAT SERPL-MCNC: 0.95 MG/DL (ref 0.51–0.95)
CRP SERPL-MCNC: 62.3 MG/L
EGFRCR SERPLBLD CKD-EPI 2021: 57 ML/MIN/1.73M2
EOSINOPHIL # BLD AUTO: 0 10E3/UL (ref 0–0.7)
EOSINOPHIL NFR BLD AUTO: 0 %
ERYTHROCYTE [DISTWIDTH] IN BLOOD BY AUTOMATED COUNT: 15.3 % (ref 10–15)
GLUCOSE SERPL-MCNC: 120 MG/DL (ref 70–99)
HCT VFR BLD AUTO: 26.4 % (ref 35–47)
HGB BLD-MCNC: 7.9 G/DL (ref 11.7–15.7)
IMM GRANULOCYTES # BLD: 0.1 10E3/UL
IMM GRANULOCYTES NFR BLD: 1 %
LYMPHOCYTES # BLD AUTO: 1.2 10E3/UL (ref 0.8–5.3)
LYMPHOCYTES NFR BLD AUTO: 10 %
MCH RBC QN AUTO: 27.6 PG (ref 26.5–33)
MCHC RBC AUTO-ENTMCNC: 29.9 G/DL (ref 31.5–36.5)
MCV RBC AUTO: 92 FL (ref 78–100)
MONOCYTES # BLD AUTO: 1.2 10E3/UL (ref 0–1.3)
MONOCYTES NFR BLD AUTO: 9 %
NEUTROPHILS # BLD AUTO: 9.8 10E3/UL (ref 1.6–8.3)
NEUTROPHILS NFR BLD AUTO: 79 %
NRBC # BLD AUTO: 0 10E3/UL
NRBC BLD AUTO-RTO: 0 /100
PLATELET # BLD AUTO: 280 10E3/UL (ref 150–450)
RBC # BLD AUTO: 2.86 10E6/UL (ref 3.8–5.2)
WBC # BLD AUTO: 12.4 10E3/UL (ref 4–11)

## 2024-09-30 PROCEDURE — 84520 ASSAY OF UREA NITROGEN: CPT | Performed by: NURSE PRACTITIONER

## 2024-09-30 PROCEDURE — P9604 ONE-WAY ALLOW PRORATED TRIP: HCPCS | Performed by: NURSE PRACTITIONER

## 2024-09-30 PROCEDURE — 82565 ASSAY OF CREATININE: CPT | Performed by: NURSE PRACTITIONER

## 2024-09-30 PROCEDURE — 82947 ASSAY GLUCOSE BLOOD QUANT: CPT | Performed by: NURSE PRACTITIONER

## 2024-09-30 PROCEDURE — 36415 COLL VENOUS BLD VENIPUNCTURE: CPT | Performed by: NURSE PRACTITIONER

## 2024-09-30 PROCEDURE — 85004 AUTOMATED DIFF WBC COUNT: CPT | Performed by: NURSE PRACTITIONER

## 2024-09-30 PROCEDURE — 86140 C-REACTIVE PROTEIN: CPT | Performed by: NURSE PRACTITIONER

## 2024-09-30 PROCEDURE — 84450 TRANSFERASE (AST) (SGOT): CPT | Performed by: NURSE PRACTITIONER

## 2024-10-04 ENCOUNTER — LAB REQUISITION (OUTPATIENT)
Dept: LAB | Facility: CLINIC | Age: 89
End: 2024-10-04
Payer: COMMERCIAL

## 2024-10-04 DIAGNOSIS — K75.0 ABSCESS OF LIVER: ICD-10-CM

## 2024-10-14 ENCOUNTER — LAB REQUISITION (OUTPATIENT)
Dept: LAB | Facility: CLINIC | Age: 89
End: 2024-10-14
Payer: COMMERCIAL

## 2024-10-14 DIAGNOSIS — K62.89 OTHER SPECIFIED DISEASES OF ANUS AND RECTUM: ICD-10-CM

## 2024-10-15 LAB
BASOPHILS # BLD AUTO: 0.1 10E3/UL (ref 0–0.2)
BASOPHILS NFR BLD AUTO: 1 %
EOSINOPHIL # BLD AUTO: 0.2 10E3/UL (ref 0–0.7)
EOSINOPHIL NFR BLD AUTO: 2 %
ERYTHROCYTE [DISTWIDTH] IN BLOOD BY AUTOMATED COUNT: 17 % (ref 10–15)
HCT VFR BLD AUTO: 27.5 % (ref 35–47)
HGB BLD-MCNC: 8.2 G/DL (ref 11.7–15.7)
IMM GRANULOCYTES # BLD: 0 10E3/UL
IMM GRANULOCYTES NFR BLD: 0 %
LYMPHOCYTES # BLD AUTO: 1.3 10E3/UL (ref 0.8–5.3)
LYMPHOCYTES NFR BLD AUTO: 12 %
MCH RBC QN AUTO: 26.4 PG (ref 26.5–33)
MCHC RBC AUTO-ENTMCNC: 29.8 G/DL (ref 31.5–36.5)
MCV RBC AUTO: 88 FL (ref 78–100)
MONOCYTES # BLD AUTO: 1.2 10E3/UL (ref 0–1.3)
MONOCYTES NFR BLD AUTO: 11 %
NEUTROPHILS # BLD AUTO: 8.1 10E3/UL (ref 1.6–8.3)
NEUTROPHILS NFR BLD AUTO: 74 %
NRBC # BLD AUTO: 0 10E3/UL
NRBC BLD AUTO-RTO: 0 /100
PLATELET # BLD AUTO: 232 10E3/UL (ref 150–450)
RBC # BLD AUTO: 3.11 10E6/UL (ref 3.8–5.2)
WBC # BLD AUTO: 10.9 10E3/UL (ref 4–11)

## 2024-10-15 PROCEDURE — 36415 COLL VENOUS BLD VENIPUNCTURE: CPT | Performed by: NURSE PRACTITIONER

## 2024-10-15 PROCEDURE — P9603 ONE-WAY ALLOW PRORATED MILES: HCPCS | Performed by: NURSE PRACTITIONER

## 2024-10-15 PROCEDURE — 85004 AUTOMATED DIFF WBC COUNT: CPT | Performed by: NURSE PRACTITIONER

## 2025-05-30 ENCOUNTER — LAB REQUISITION (OUTPATIENT)
Dept: LAB | Facility: CLINIC | Age: 89
End: 2025-05-30
Payer: COMMERCIAL

## 2025-05-30 DIAGNOSIS — C20 MALIGNANT NEOPLASM OF RECTUM (H): ICD-10-CM

## 2025-05-30 DIAGNOSIS — I50.22 CHRONIC SYSTOLIC (CONGESTIVE) HEART FAILURE (H): ICD-10-CM

## 2025-05-30 DIAGNOSIS — E03.9 HYPOTHYROIDISM, UNSPECIFIED: ICD-10-CM

## 2025-05-30 DIAGNOSIS — Z43.3 ENCOUNTER FOR ATTENTION TO COLOSTOMY (H): ICD-10-CM

## 2025-06-02 LAB
ANION GAP SERPL CALCULATED.3IONS-SCNC: 12 MMOL/L (ref 7–15)
BASOPHILS # BLD AUTO: 0.1 10E3/UL (ref 0–0.2)
BASOPHILS NFR BLD AUTO: 1 %
BUN SERPL-MCNC: 21.3 MG/DL (ref 8–23)
CALCIUM SERPL-MCNC: 8.8 MG/DL (ref 8.8–10.4)
CHLORIDE SERPL-SCNC: 97 MMOL/L (ref 98–107)
CREAT SERPL-MCNC: 0.7 MG/DL (ref 0.51–0.95)
EGFRCR SERPLBLD CKD-EPI 2021: 82 ML/MIN/1.73M2
EOSINOPHIL # BLD AUTO: 0.6 10E3/UL (ref 0–0.7)
EOSINOPHIL NFR BLD AUTO: 9 %
ERYTHROCYTE [DISTWIDTH] IN BLOOD BY AUTOMATED COUNT: 15.7 % (ref 10–15)
GLUCOSE SERPL-MCNC: 93 MG/DL (ref 70–99)
HCO3 SERPL-SCNC: 23 MMOL/L (ref 22–29)
HCT VFR BLD AUTO: 25.8 % (ref 35–47)
HGB BLD-MCNC: 8.1 G/DL (ref 11.7–15.7)
IMM GRANULOCYTES # BLD: 0 10E3/UL
IMM GRANULOCYTES NFR BLD: 1 %
LYMPHOCYTES # BLD AUTO: 0.7 10E3/UL (ref 0.8–5.3)
LYMPHOCYTES NFR BLD AUTO: 11 %
MCH RBC QN AUTO: 28.6 PG (ref 26.5–33)
MCHC RBC AUTO-ENTMCNC: 31.4 G/DL (ref 31.5–36.5)
MCV RBC AUTO: 91 FL (ref 78–100)
MONOCYTES # BLD AUTO: 0.9 10E3/UL (ref 0–1.3)
MONOCYTES NFR BLD AUTO: 14 %
NEUTROPHILS # BLD AUTO: 4.3 10E3/UL (ref 1.6–8.3)
NEUTROPHILS NFR BLD AUTO: 65 %
NRBC # BLD AUTO: 0 10E3/UL
NRBC BLD AUTO-RTO: 0 /100
NT-PROBNP SERPL-MCNC: 2657 PG/ML (ref 0–624)
PLATELET # BLD AUTO: 223 10E3/UL (ref 150–450)
POTASSIUM SERPL-SCNC: 3.9 MMOL/L (ref 3.4–5.3)
RBC # BLD AUTO: 2.83 10E6/UL (ref 3.8–5.2)
SODIUM SERPL-SCNC: 132 MMOL/L (ref 135–145)
TSH SERPL DL<=0.005 MIU/L-ACNC: 9.95 UIU/ML (ref 0.3–4.2)
WBC # BLD AUTO: 6.6 10E3/UL (ref 4–11)

## 2025-06-02 PROCEDURE — 83880 ASSAY OF NATRIURETIC PEPTIDE: CPT | Performed by: NURSE PRACTITIONER

## 2025-06-02 PROCEDURE — 85025 COMPLETE CBC W/AUTO DIFF WBC: CPT | Performed by: NURSE PRACTITIONER

## 2025-06-02 PROCEDURE — 80048 BASIC METABOLIC PNL TOTAL CA: CPT | Performed by: NURSE PRACTITIONER

## 2025-06-02 PROCEDURE — P9603 ONE-WAY ALLOW PRORATED MILES: HCPCS | Performed by: NURSE PRACTITIONER

## 2025-06-02 PROCEDURE — 36415 COLL VENOUS BLD VENIPUNCTURE: CPT | Performed by: NURSE PRACTITIONER

## 2025-06-02 PROCEDURE — 84443 ASSAY THYROID STIM HORMONE: CPT | Performed by: NURSE PRACTITIONER
